# Patient Record
Sex: MALE | Race: WHITE | Employment: UNEMPLOYED | URBAN - METROPOLITAN AREA
[De-identification: names, ages, dates, MRNs, and addresses within clinical notes are randomized per-mention and may not be internally consistent; named-entity substitution may affect disease eponyms.]

---

## 2017-01-22 ENCOUNTER — HOSPITAL ENCOUNTER (EMERGENCY)
Facility: HOSPITAL | Age: 57
Discharge: HOME | End: 2017-01-22
Attending: EMERGENCY MEDICINE
Payer: COMMERCIAL

## 2017-01-22 ENCOUNTER — APPOINTMENT (OUTPATIENT)
Dept: CT IMAGING | Facility: HOSPITAL | Age: 57
End: 2017-01-22
Attending: EMERGENCY MEDICINE
Payer: COMMERCIAL

## 2017-01-22 VITALS
RESPIRATION RATE: 16 BRPM | WEIGHT: 275 LBS | TEMPERATURE: 98 F | HEIGHT: 76 IN | SYSTOLIC BLOOD PRESSURE: 153 MMHG | HEART RATE: 78 BPM | OXYGEN SATURATION: 95 % | DIASTOLIC BLOOD PRESSURE: 86 MMHG | BODY MASS INDEX: 33.49 KG/M2

## 2017-01-22 DIAGNOSIS — G40.909 SEIZURE DISORDER (HCC): Primary | ICD-10-CM

## 2017-01-22 LAB
ALBUMIN SERPL-MCNC: 3.9 G/DL (ref 3.5–4.8)
ALP LIVER SERPL-CCNC: 109 U/L (ref 45–117)
ALT SERPL-CCNC: 21 U/L (ref 17–63)
AST SERPL-CCNC: 14 U/L (ref 15–41)
ATRIAL RATE: 79 BPM
BASOPHILS # BLD AUTO: 0.03 X10(3) UL (ref 0–0.1)
BASOPHILS NFR BLD AUTO: 0.6 %
BILIRUB SERPL-MCNC: 0.4 MG/DL (ref 0.1–2)
BILIRUB UR QL STRIP.AUTO: NEGATIVE
BUN BLD-MCNC: 19 MG/DL (ref 8–20)
CALCIUM BLD-MCNC: 8.8 MG/DL (ref 8.3–10.3)
CHLORIDE: 105 MMOL/L (ref 101–111)
CLARITY UR REFRACT.AUTO: CLEAR
CO2: 26 MMOL/L (ref 22–32)
COLOR UR AUTO: YELLOW
CREAT BLD-MCNC: 0.92 MG/DL (ref 0.7–1.3)
EOSINOPHIL # BLD AUTO: 0.18 X10(3) UL (ref 0–0.3)
EOSINOPHIL NFR BLD AUTO: 3.8 %
ERYTHROCYTE [DISTWIDTH] IN BLOOD BY AUTOMATED COUNT: 12.9 % (ref 11.5–16)
GLUCOSE BLD-MCNC: 103 MG/DL (ref 70–99)
GLUCOSE BLD-MCNC: 93 MG/DL (ref 65–99)
GLUCOSE UR STRIP.AUTO-MCNC: NEGATIVE MG/DL
HCT VFR BLD AUTO: 42.6 % (ref 37–53)
HGB BLD-MCNC: 14.7 G/DL (ref 13–17)
IMMATURE GRANULOCYTE COUNT: 0.02 X10(3) UL (ref 0–1)
IMMATURE GRANULOCYTE RATIO %: 0.4 %
KETONES UR STRIP.AUTO-MCNC: NEGATIVE MG/DL
LEUKOCYTE ESTERASE UR QL STRIP.AUTO: NEGATIVE
LYMPHOCYTES # BLD AUTO: 1.25 X10(3) UL (ref 0.9–4)
LYMPHOCYTES NFR BLD AUTO: 26 %
M PROTEIN MFR SERPL ELPH: 7.7 G/DL (ref 6.1–8.3)
MCH RBC QN AUTO: 29.8 PG (ref 27–33.2)
MCHC RBC AUTO-ENTMCNC: 34.5 G/DL (ref 31–37)
MCV RBC AUTO: 86.2 FL (ref 80–99)
MONOCYTES # BLD AUTO: 0.45 X10(3) UL (ref 0.1–0.6)
MONOCYTES NFR BLD AUTO: 9.4 %
NEUTROPHIL ABS PRELIM: 2.87 X10 (3) UL (ref 1.3–6.7)
NEUTROPHILS # BLD AUTO: 2.87 X10(3) UL (ref 1.3–6.7)
NEUTROPHILS NFR BLD AUTO: 59.8 %
NITRITE UR QL STRIP.AUTO: NEGATIVE
P AXIS: 21 DEGREES
P-R INTERVAL: 136 MS
PH UR STRIP.AUTO: 7 [PH] (ref 4.5–8)
PLATELET # BLD AUTO: 230 10(3)UL (ref 150–450)
POTASSIUM SERPL-SCNC: 4.1 MMOL/L (ref 3.6–5.1)
PROT UR STRIP.AUTO-MCNC: NEGATIVE MG/DL
Q-T INTERVAL: 400 MS
QRS DURATION: 98 MS
QTC CALCULATION (BEZET): 458 MS
R AXIS: -8 DEGREES
RBC # BLD AUTO: 4.94 X10(6)UL (ref 4.3–5.7)
RBC UR QL AUTO: NEGATIVE
RED CELL DISTRIBUTION WIDTH-SD: 39.8 FL (ref 35.1–46.3)
SODIUM SERPL-SCNC: 138 MMOL/L (ref 136–144)
SP GR UR STRIP.AUTO: 1.01 (ref 1–1.03)
T AXIS: 62 DEGREES
UROBILINOGEN UR STRIP.AUTO-MCNC: <2 MG/DL
VENTRICULAR RATE: 79 BPM
WBC # BLD AUTO: 4.8 X10(3) UL (ref 4–13)

## 2017-01-22 PROCEDURE — 96365 THER/PROPH/DIAG IV INF INIT: CPT

## 2017-01-22 PROCEDURE — 80053 COMPREHEN METABOLIC PANEL: CPT | Performed by: EMERGENCY MEDICINE

## 2017-01-22 PROCEDURE — 96375 TX/PRO/DX INJ NEW DRUG ADDON: CPT

## 2017-01-22 PROCEDURE — 93005 ELECTROCARDIOGRAM TRACING: CPT

## 2017-01-22 PROCEDURE — 99285 EMERGENCY DEPT VISIT HI MDM: CPT

## 2017-01-22 PROCEDURE — 85025 COMPLETE CBC W/AUTO DIFF WBC: CPT | Performed by: EMERGENCY MEDICINE

## 2017-01-22 PROCEDURE — 81003 URINALYSIS AUTO W/O SCOPE: CPT | Performed by: EMERGENCY MEDICINE

## 2017-01-22 PROCEDURE — 82962 GLUCOSE BLOOD TEST: CPT

## 2017-01-22 PROCEDURE — 93010 ELECTROCARDIOGRAM REPORT: CPT

## 2017-01-22 PROCEDURE — 70450 CT HEAD/BRAIN W/O DYE: CPT

## 2017-01-22 RX ORDER — LEVETIRACETAM 500 MG/1
500 TABLET ORAL 2 TIMES DAILY
Qty: 60 TABLET | Refills: 0 | Status: SHIPPED | OUTPATIENT
Start: 2017-01-22 | End: 2017-02-08

## 2017-01-22 RX ORDER — LEVETIRACETAM 500 MG/1
500 TABLET ORAL ONCE
Status: DISCONTINUED | OUTPATIENT
Start: 2017-01-22 | End: 2017-01-22

## 2017-01-22 RX ORDER — LORAZEPAM 2 MG/ML
1 INJECTION INTRAMUSCULAR ONCE
Status: COMPLETED | OUTPATIENT
Start: 2017-01-22 | End: 2017-01-22

## 2017-01-22 NOTE — ED NOTES
Patient had another episode witnessed by family. A scream was heard from the room and wife came out to request assistance. Upon entering the room, patient is quiet and dazed. Patient identified that he was in the hospital but was unsure why.  MD was at beds
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
## 2017-01-24 PROCEDURE — 82746 ASSAY OF FOLIC ACID SERUM: CPT | Performed by: OTHER

## 2017-01-24 PROCEDURE — 82607 VITAMIN B-12: CPT | Performed by: OTHER

## 2017-01-24 PROCEDURE — 86038 ANTINUCLEAR ANTIBODIES: CPT | Performed by: OTHER

## 2017-01-24 PROCEDURE — 80177 DRUG SCRN QUAN LEVETIRACETAM: CPT | Performed by: OTHER

## 2017-03-02 PROCEDURE — 82746 ASSAY OF FOLIC ACID SERUM: CPT | Performed by: FAMILY MEDICINE

## 2017-03-02 PROCEDURE — 82607 VITAMIN B-12: CPT | Performed by: FAMILY MEDICINE

## 2017-03-24 PROCEDURE — 82941 ASSAY OF GASTRIN: CPT | Performed by: INTERNAL MEDICINE

## 2017-03-24 PROCEDURE — 83516 IMMUNOASSAY NONANTIBODY: CPT | Performed by: INTERNAL MEDICINE

## 2017-03-24 PROCEDURE — 82746 ASSAY OF FOLIC ACID SERUM: CPT | Performed by: FAMILY MEDICINE

## 2017-03-24 PROCEDURE — 82607 VITAMIN B-12: CPT | Performed by: FAMILY MEDICINE

## 2017-03-24 PROCEDURE — 86340 INTRINSIC FACTOR ANTIBODY: CPT | Performed by: INTERNAL MEDICINE

## 2017-05-11 PROCEDURE — 80177 DRUG SCRN QUAN LEVETIRACETAM: CPT | Performed by: OTHER

## 2017-05-15 PROBLEM — D51.0 PERNICIOUS ANEMIA: Status: ACTIVE | Noted: 2017-05-15

## 2017-07-07 PROCEDURE — 83921 ORGANIC ACID SINGLE QUANT: CPT | Performed by: INTERNAL MEDICINE

## 2017-07-07 PROCEDURE — 82746 ASSAY OF FOLIC ACID SERUM: CPT | Performed by: INTERNAL MEDICINE

## 2017-07-07 PROCEDURE — 82607 VITAMIN B-12: CPT | Performed by: INTERNAL MEDICINE

## 2017-09-15 PROCEDURE — 81003 URINALYSIS AUTO W/O SCOPE: CPT | Performed by: FAMILY MEDICINE

## 2017-10-17 PROCEDURE — 83921 ORGANIC ACID SINGLE QUANT: CPT | Performed by: INTERNAL MEDICINE

## 2017-10-17 PROCEDURE — 82746 ASSAY OF FOLIC ACID SERUM: CPT | Performed by: INTERNAL MEDICINE

## 2017-10-17 PROCEDURE — 82607 VITAMIN B-12: CPT | Performed by: INTERNAL MEDICINE

## 2018-01-10 PROCEDURE — 80177 DRUG SCRN QUAN LEVETIRACETAM: CPT | Performed by: OTHER

## 2018-01-10 PROCEDURE — 36415 COLL VENOUS BLD VENIPUNCTURE: CPT | Performed by: OTHER

## 2018-04-17 PROCEDURE — 82607 VITAMIN B-12: CPT | Performed by: INTERNAL MEDICINE

## 2018-04-17 PROCEDURE — 82746 ASSAY OF FOLIC ACID SERUM: CPT | Performed by: INTERNAL MEDICINE

## 2018-04-17 PROCEDURE — 83921 ORGANIC ACID SINGLE QUANT: CPT | Performed by: INTERNAL MEDICINE

## 2018-05-02 PROBLEM — M75.100 ROTATOR CUFF SYNDROME, UNSPECIFIED LATERALITY: Status: ACTIVE | Noted: 2018-05-02

## 2018-05-03 ENCOUNTER — HOSPITAL ENCOUNTER (INPATIENT)
Facility: HOSPITAL | Age: 58
LOS: 2 days | Discharge: HOME | DRG: 195 | End: 2018-05-05
Attending: EMERGENCY MEDICINE | Admitting: INTERNAL MEDICINE
Payer: COMMERCIAL

## 2018-05-03 ENCOUNTER — APPOINTMENT (OUTPATIENT)
Dept: GENERAL RADIOLOGY | Facility: HOSPITAL | Age: 58
DRG: 195 | End: 2018-05-03
Payer: COMMERCIAL

## 2018-05-03 DIAGNOSIS — R09.02 HYPOXIA: ICD-10-CM

## 2018-05-03 DIAGNOSIS — J18.9 COMMUNITY ACQUIRED PNEUMONIA, UNSPECIFIED LATERALITY: ICD-10-CM

## 2018-05-03 DIAGNOSIS — R50.9 FEVER, UNSPECIFIED FEVER CAUSE: Primary | ICD-10-CM

## 2018-05-03 PROCEDURE — 96365 THER/PROPH/DIAG IV INF INIT: CPT

## 2018-05-03 PROCEDURE — 71046 X-RAY EXAM CHEST 2 VIEWS: CPT

## 2018-05-03 PROCEDURE — 87040 BLOOD CULTURE FOR BACTERIA: CPT | Performed by: EMERGENCY MEDICINE

## 2018-05-03 PROCEDURE — 85025 COMPLETE CBC W/AUTO DIFF WBC: CPT | Performed by: EMERGENCY MEDICINE

## 2018-05-03 PROCEDURE — 80053 COMPREHEN METABOLIC PANEL: CPT | Performed by: EMERGENCY MEDICINE

## 2018-05-03 PROCEDURE — 94640 AIRWAY INHALATION TREATMENT: CPT

## 2018-05-03 PROCEDURE — 96361 HYDRATE IV INFUSION ADD-ON: CPT

## 2018-05-03 PROCEDURE — 36415 COLL VENOUS BLD VENIPUNCTURE: CPT

## 2018-05-03 PROCEDURE — 83605 ASSAY OF LACTIC ACID: CPT | Performed by: EMERGENCY MEDICINE

## 2018-05-03 PROCEDURE — 99285 EMERGENCY DEPT VISIT HI MDM: CPT

## 2018-05-03 PROCEDURE — 96375 TX/PRO/DX INJ NEW DRUG ADDON: CPT

## 2018-05-03 RX ORDER — ACETAMINOPHEN 500 MG
1000 TABLET ORAL ONCE
Status: COMPLETED | OUTPATIENT
Start: 2018-05-03 | End: 2018-05-03

## 2018-05-03 RX ORDER — ONDANSETRON 2 MG/ML
4 INJECTION INTRAMUSCULAR; INTRAVENOUS EVERY 4 HOURS PRN
Status: DISCONTINUED | OUTPATIENT
Start: 2018-05-03 | End: 2018-05-04

## 2018-05-03 RX ORDER — IPRATROPIUM BROMIDE AND ALBUTEROL SULFATE 2.5; .5 MG/3ML; MG/3ML
3 SOLUTION RESPIRATORY (INHALATION) ONCE
Status: COMPLETED | OUTPATIENT
Start: 2018-05-03 | End: 2018-05-03

## 2018-05-03 RX ORDER — KETOROLAC TROMETHAMINE 30 MG/ML
30 INJECTION, SOLUTION INTRAMUSCULAR; INTRAVENOUS ONCE
Status: COMPLETED | OUTPATIENT
Start: 2018-05-03 | End: 2018-05-03

## 2018-05-03 RX ORDER — ACETAMINOPHEN 500 MG
TABLET ORAL
Status: COMPLETED
Start: 2018-05-03 | End: 2018-05-03

## 2018-05-03 RX ORDER — SODIUM CHLORIDE 9 MG/ML
INJECTION, SOLUTION INTRAVENOUS CONTINUOUS
Status: ACTIVE | OUTPATIENT
Start: 2018-05-03 | End: 2018-05-04

## 2018-05-04 PROCEDURE — 82962 GLUCOSE BLOOD TEST: CPT

## 2018-05-04 PROCEDURE — 87798 DETECT AGENT NOS DNA AMP: CPT | Performed by: HOSPITALIST

## 2018-05-04 PROCEDURE — 85025 COMPLETE CBC W/AUTO DIFF WBC: CPT | Performed by: INTERNAL MEDICINE

## 2018-05-04 PROCEDURE — 87449 NOS EACH ORGANISM AG IA: CPT | Performed by: HOSPITALIST

## 2018-05-04 PROCEDURE — 87999 UNLISTED MICROBIOLOGY PX: CPT

## 2018-05-04 PROCEDURE — 87899 AGENT NOS ASSAY W/OPTIC: CPT | Performed by: HOSPITALIST

## 2018-05-04 PROCEDURE — 87633 RESP VIRUS 12-25 TARGETS: CPT | Performed by: HOSPITALIST

## 2018-05-04 PROCEDURE — 87581 M.PNEUMON DNA AMP PROBE: CPT | Performed by: HOSPITALIST

## 2018-05-04 PROCEDURE — 80048 BASIC METABOLIC PNL TOTAL CA: CPT | Performed by: INTERNAL MEDICINE

## 2018-05-04 PROCEDURE — 83036 HEMOGLOBIN GLYCOSYLATED A1C: CPT | Performed by: INTERNAL MEDICINE

## 2018-05-04 PROCEDURE — 87486 CHLMYD PNEUM DNA AMP PROBE: CPT | Performed by: HOSPITALIST

## 2018-05-04 RX ORDER — LEVOTHYROXINE SODIUM 0.2 MG/1
200 TABLET ORAL
Status: DISCONTINUED | OUTPATIENT
Start: 2018-05-04 | End: 2018-05-04

## 2018-05-04 RX ORDER — ONDANSETRON 2 MG/ML
4 INJECTION INTRAMUSCULAR; INTRAVENOUS EVERY 6 HOURS PRN
Status: DISCONTINUED | OUTPATIENT
Start: 2018-05-04 | End: 2018-05-05

## 2018-05-04 RX ORDER — IPRATROPIUM BROMIDE AND ALBUTEROL SULFATE 2.5; .5 MG/3ML; MG/3ML
3 SOLUTION RESPIRATORY (INHALATION) EVERY 4 HOURS PRN
Status: DISCONTINUED | OUTPATIENT
Start: 2018-05-04 | End: 2018-05-05

## 2018-05-04 RX ORDER — LEVETIRACETAM 500 MG/1
1000 TABLET ORAL 2 TIMES DAILY
Status: DISCONTINUED | OUTPATIENT
Start: 2018-05-04 | End: 2018-05-05

## 2018-05-04 RX ORDER — SODIUM CHLORIDE 9 MG/ML
INJECTION, SOLUTION INTRAVENOUS CONTINUOUS
Status: DISCONTINUED | OUTPATIENT
Start: 2018-05-04 | End: 2018-05-05

## 2018-05-04 RX ORDER — ENOXAPARIN SODIUM 100 MG/ML
40 INJECTION SUBCUTANEOUS DAILY
Status: DISCONTINUED | OUTPATIENT
Start: 2018-05-04 | End: 2018-05-05

## 2018-05-04 RX ORDER — LEVETIRACETAM 1000 MG/1
1000 TABLET ORAL 2 TIMES DAILY
COMMUNITY
End: 2019-01-03

## 2018-05-04 RX ORDER — DEXTROSE MONOHYDRATE 25 G/50ML
50 INJECTION, SOLUTION INTRAVENOUS
Status: DISCONTINUED | OUTPATIENT
Start: 2018-05-04 | End: 2018-05-05

## 2018-05-04 RX ORDER — BISACODYL 10 MG
10 SUPPOSITORY, RECTAL RECTAL
Status: DISCONTINUED | OUTPATIENT
Start: 2018-05-04 | End: 2018-05-05

## 2018-05-04 RX ORDER — ACETAMINOPHEN 325 MG/1
650 TABLET ORAL EVERY 6 HOURS PRN
Status: DISCONTINUED | OUTPATIENT
Start: 2018-05-04 | End: 2018-05-05

## 2018-05-04 RX ORDER — METOCLOPRAMIDE HYDROCHLORIDE 5 MG/ML
10 INJECTION INTRAMUSCULAR; INTRAVENOUS EVERY 8 HOURS PRN
Status: DISCONTINUED | OUTPATIENT
Start: 2018-05-04 | End: 2018-05-05

## 2018-05-04 RX ORDER — POLYETHYLENE GLYCOL 3350 17 G/17G
17 POWDER, FOR SOLUTION ORAL DAILY PRN
Status: DISCONTINUED | OUTPATIENT
Start: 2018-05-04 | End: 2018-05-05

## 2018-05-04 RX ORDER — DOCUSATE SODIUM 100 MG/1
100 CAPSULE, LIQUID FILLED ORAL 2 TIMES DAILY
Status: DISCONTINUED | OUTPATIENT
Start: 2018-05-04 | End: 2018-05-05

## 2018-05-04 RX ORDER — LEVOTHYROXINE SODIUM 0.2 MG/1
200 TABLET ORAL
Status: DISCONTINUED | OUTPATIENT
Start: 2018-05-04 | End: 2018-05-05

## 2018-05-04 RX ORDER — BENZONATATE 200 MG/1
200 CAPSULE ORAL 3 TIMES DAILY
Status: DISCONTINUED | OUTPATIENT
Start: 2018-05-04 | End: 2018-05-05

## 2018-05-04 RX ORDER — SODIUM PHOSPHATE, DIBASIC AND SODIUM PHOSPHATE, MONOBASIC 7; 19 G/133ML; G/133ML
1 ENEMA RECTAL ONCE AS NEEDED
Status: DISCONTINUED | OUTPATIENT
Start: 2018-05-04 | End: 2018-05-05

## 2018-05-04 NOTE — HOME CARE LIAISON
Received referral from [address] [place] [address]. Met with patient at bedside. Discussed [facility] services upon discharge. Patient declined servcies. Brochure left with patient.

## 2018-05-04 NOTE — PAYOR COMM NOTE
--------------  ADMISSION REVIEW       5/3    ED LATE       Patient presents with:  Dyspnea YARY SOB   Fever   Cough/URI     Stated Complaint: yary/cough/fever + DYSPNEA        This is a 59-year-old male who is been having a cold, runny nose for for the last 102.9  Community acquired pneumonia,  Hypoxia SATS 91     ZITHROMAX IV QD  ROCEPHIN IV QD  DUONEBS  IVF BOLUS

## 2018-05-04 NOTE — CM/SW NOTE
05/04/18 1600   CM/SW Screening   Referral Source    Information Source Chart review;Nursing rounds   Patient's Mental Status Alert;Oriented   Patient's [address]   Patient lives with Spouse   Patient Status Prior to Admission

## 2018-05-04 NOTE — PROGRESS NOTES
[facility] Pharmacy Note: Antimicrobial Weight Dose Adjustment for: ceftriaxone (ROCEPHIN)    Liza Friday is a 56year old male who has been prescribed ceftriaxone (ROCEPHIN) 1 gram IVPB for one dose. Pt has a weight/BMI of 137 kg/36.  Based on this criteria and

## 2018-05-04 NOTE — PROGRESS NOTES
Multidisciplinary Discharge Rounds held 5/4/2018. Treatment team members present today include , , Charge Nurse,  Nurse, RT, PT and Pharmacy caring for [facility].      Other care providers present:    Patient Active Problem Alray Stable

## 2018-05-04 NOTE — ED PROVIDER NOTES
Patient Seen in: [facility] Emergency Department    History   Patient presents with:  Dyspnea YARY SOB (respiratory)  Fever (infectious)  Cough/URI    Stated Complaint: yary/cough/fever    HPI    This is a 61-year-old male who is been having a cold, r noted above.     Physical Exam   ED Triage Vitals [05/03/18 7590]  BP: 124/80  Pulse: 114  Resp: (!) 32  Temp: 99.9 °F (37.7 °C)  Temp src: Temporal  SpO2: 94 %  O2 Device: None (Room air)    Current:/74   Pulse 92   Temp 100.2 °F (37.9 °C) (Oral)   R Course as of May 03 2222  ------------------------------------------------------------      [facility]     Admission disposition: 5/3/2018  9:28 PM           The patient was placed on monitors, IV was started, blood was drawn. .  Xr Chest Pa + Lat Chest (cpt=71046 Clinical Impression:  Fever, unspecified fever cause  (primary encounter diagnosis)  Community acquired pneumonia, unspecified laterality  Hypoxia    Disposition:  Admit  5/3/2018  9:28 pm    Follow-up:  No follow-up provider specified.       Medication

## 2018-05-04 NOTE — H&P
[facility] Hospitalist History and Physical      Patient presents with:  Dyspnea ANJUM SOB (respiratory)  Fever (infectious)  Cough/URI       PCP: Layla Davis DO      History of Present Illness: Patient is a 56year old male with PMH sig for hypothyroidism, seiz Mother    • Dementia Mother    • Other [OTHER] Brother      thyroid cancer   • Cancer Paternal Grandfather      Unknown type       Review of Systems  Comprehensive ROS reviewed and negative except for what is stated in HPI.       OBJECTIVE:  /78   Pul seen within the left lung base. Right lung is clear. Bronchial wall thickening is CARDIAC:  Normal size cardiac silhouette. MEDIASTINUM:  Normal. PLEURA:  Normal.  No pleural effusions. BONES:  Normal for age. CONCLUSION:  1.  Airspace opacity is wit intact. There is no fracture or dislocation. Soft tissues are normal. Lung field and view is grossly clear. IMPRESSION:  Unremarkable bilateral shoulder films with minimal degenerative changes of the AC joints bilaterally.        Assessment/Plan:     56

## 2018-05-04 NOTE — PLAN OF CARE
CARDIOVASCULAR - ADULT    • Absence of cardiac arrhythmias or at baseline Progressing        Diabetes/Glucose Control    • Glucose maintained within prescribed range Progressing        METABOLIC/FLUID AND ELECTROLYTES - ADULT    • Glucose maintained within

## 2018-05-04 NOTE — PLAN OF CARE
Problem: Patient/Family Goals  Goal: Patient/Family Short Term Goal  Patient's Short Term Goal:   5/4 Admit: to get medications and sleep  5/4-resolve pna    Interventions:   5/4-iv abx  - Admit orders  - Cluster care  - See additional Care Plan goals for

## 2018-05-04 NOTE — PLAN OF CARE
NURSING ADMISSION NOTE      Patient admitted via Cart  Oriented to room 514. Safety precautions initiated. Bed in low position. Call light in reach.

## 2018-05-04 NOTE — ED NOTES
Pt received at this time awake and alert but very weak and moaning. Pt hot and flushed with semi-moist skin. Pt respirations are fast and have a labored expiratory phase. Lungs clear in all fields and frequent bronchial cough noted.   Pt has a runny nose

## 2018-05-05 VITALS
WEIGHT: 305 LBS | RESPIRATION RATE: 18 BRPM | DIASTOLIC BLOOD PRESSURE: 76 MMHG | BODY MASS INDEX: 37.14 KG/M2 | SYSTOLIC BLOOD PRESSURE: 128 MMHG | OXYGEN SATURATION: 96 % | HEART RATE: 61 BPM | TEMPERATURE: 98 F | HEIGHT: 76 IN

## 2018-05-05 PROCEDURE — 82962 GLUCOSE BLOOD TEST: CPT

## 2018-05-05 PROCEDURE — 84132 ASSAY OF SERUM POTASSIUM: CPT | Performed by: INTERNAL MEDICINE

## 2018-05-05 RX ORDER — AZITHROMYCIN 500 MG/1
500 TABLET, FILM COATED ORAL DAILY
Qty: 1 TABLET | Refills: 0 | Status: SHIPPED | OUTPATIENT
Start: 2018-05-06 | End: 2018-05-07

## 2018-05-05 RX ORDER — POTASSIUM CHLORIDE 20 MEQ/1
40 TABLET, EXTENDED RELEASE ORAL ONCE
Status: COMPLETED | OUTPATIENT
Start: 2018-05-05 | End: 2018-05-05

## 2018-05-05 RX ORDER — CEFDINIR 300 MG/1
300 CAPSULE ORAL 2 TIMES DAILY
Qty: 16 CAPSULE | Refills: 0 | Status: SHIPPED | OUTPATIENT
Start: 2018-05-05 | End: 2018-05-13

## 2018-05-05 RX ORDER — BENZONATATE 200 MG/1
200 CAPSULE ORAL 3 TIMES DAILY PRN
Qty: 30 CAPSULE | Refills: 0 | Status: SHIPPED | OUTPATIENT
Start: 2018-05-05 | End: 2018-07-02 | Stop reason: ALTCHOICE

## 2018-05-05 NOTE — DISCHARGE SUMMARY
[address], [place]  Authorization for Invasive Procedures  1.  I hereby authorize Dr. Rain Cano , my physician and whomever may be designated as the doctor's assistant, to perform the following operation and/or procedure:  Cholecystom General Medicine Discharge Summary     Patient ID:  Heide Birch  56year old  10/23/1960    Admit date: 5/3/2018    Discharge date and time: 5/5/18    Attending Physician: Xuan Lorenzana MD     Primary C further management    Hospital course:    Hypoxia- resolved on discharge  -2/2 PNA  -on room air on day of discharge     PNA  -CXR personally reviewed, report with left airspace opacity  -IV ceftriaxone and azithromycin transitioned to PO omnicef and azith performed for the purposes of advancing medicine, science, and/or education, provided my identity is not revealed. If the procedure has been videotaped, the physician/surgeon will obtain the original videotape.  The hospital will not be responsible for stor My signature below affirms that prior to the time of the procedure, I have explained to the patient and/or his legal representative, the risks and benefits involved in the proposed treatment and any reasonable alternative to the proposed treatment.  I have care for discharge: 40 minutes    Yao Urias MD  [facility] Hospitalist  [phone]

## 2018-05-05 NOTE — PROGRESS NOTES
NURSING DISCHARGE NOTE    Discharged Home via Wheelchair. Accompanied by Family member and Support staff  Belongings Taken by patient/family. Discharge education completed.  New prescription and disease information handouts provided to and reviewed

## 2018-05-07 ENCOUNTER — APPOINTMENT (OUTPATIENT)
Dept: GENERAL RADIOLOGY | Facility: HOSPITAL | Age: 58
End: 2018-05-07
Attending: EMERGENCY MEDICINE
Payer: COMMERCIAL

## 2018-05-07 ENCOUNTER — HOSPITAL ENCOUNTER (EMERGENCY)
Facility: HOSPITAL | Age: 58
Discharge: HOME | End: 2018-05-07
Attending: EMERGENCY MEDICINE
Payer: COMMERCIAL

## 2018-05-07 VITALS
WEIGHT: 295 LBS | DIASTOLIC BLOOD PRESSURE: 74 MMHG | SYSTOLIC BLOOD PRESSURE: 115 MMHG | OXYGEN SATURATION: 93 % | TEMPERATURE: 98 F | HEART RATE: 68 BPM | RESPIRATION RATE: 17 BRPM | BODY MASS INDEX: 35.92 KG/M2 | HEIGHT: 76 IN

## 2018-05-07 DIAGNOSIS — J18.9 COMMUNITY ACQUIRED PNEUMONIA, UNSPECIFIED LATERALITY: Primary | ICD-10-CM

## 2018-05-07 DIAGNOSIS — R05.9 COUGH: ICD-10-CM

## 2018-05-07 PROCEDURE — 96361 HYDRATE IV INFUSION ADD-ON: CPT

## 2018-05-07 PROCEDURE — 94640 AIRWAY INHALATION TREATMENT: CPT

## 2018-05-07 PROCEDURE — 80053 COMPREHEN METABOLIC PANEL: CPT | Performed by: EMERGENCY MEDICINE

## 2018-05-07 PROCEDURE — 85025 COMPLETE CBC W/AUTO DIFF WBC: CPT | Performed by: EMERGENCY MEDICINE

## 2018-05-07 PROCEDURE — 99285 EMERGENCY DEPT VISIT HI MDM: CPT

## 2018-05-07 PROCEDURE — 36415 COLL VENOUS BLD VENIPUNCTURE: CPT

## 2018-05-07 PROCEDURE — 93010 ELECTROCARDIOGRAM REPORT: CPT

## 2018-05-07 PROCEDURE — 71045 X-RAY EXAM CHEST 1 VIEW: CPT | Performed by: EMERGENCY MEDICINE

## 2018-05-07 PROCEDURE — 96374 THER/PROPH/DIAG INJ IV PUSH: CPT

## 2018-05-07 PROCEDURE — 87040 BLOOD CULTURE FOR BACTERIA: CPT | Performed by: EMERGENCY MEDICINE

## 2018-05-07 PROCEDURE — 83605 ASSAY OF LACTIC ACID: CPT | Performed by: EMERGENCY MEDICINE

## 2018-05-07 PROCEDURE — 93005 ELECTROCARDIOGRAM TRACING: CPT

## 2018-05-07 RX ORDER — IPRATROPIUM BROMIDE AND ALBUTEROL SULFATE 2.5; .5 MG/3ML; MG/3ML
3 SOLUTION RESPIRATORY (INHALATION) ONCE
Status: COMPLETED | OUTPATIENT
Start: 2018-05-07 | End: 2018-05-07

## 2018-05-07 RX ORDER — METHYLPREDNISOLONE SODIUM SUCCINATE 125 MG/2ML
125 INJECTION, POWDER, LYOPHILIZED, FOR SOLUTION INTRAMUSCULAR; INTRAVENOUS ONCE
Status: COMPLETED | OUTPATIENT
Start: 2018-05-07 | End: 2018-05-07

## 2018-05-07 RX ORDER — ALBUTEROL SULFATE 2.5 MG/3ML
2.5 SOLUTION RESPIRATORY (INHALATION) EVERY 4 HOURS PRN
Qty: 30 AMPULE | Refills: 0 | Status: SHIPPED | OUTPATIENT
Start: 2018-05-07 | End: 2018-06-06

## 2018-05-07 RX ORDER — PREDNISONE 20 MG/1
60 TABLET ORAL DAILY
Qty: 12 TABLET | Refills: 0 | Status: SHIPPED | OUTPATIENT
Start: 2018-05-08 | End: 2018-05-12

## 2018-05-07 NOTE — ED INITIAL ASSESSMENT (HPI)
Pt aox4. Pt presents to ed from home accompanied by wife. Pt ws recently admitted to hospital for pneumonia.  Pt returns to ed with fever, yary, cough

## 2018-05-07 NOTE — ED NOTES
Discussed with Dr. Sukh Sylvester about 30/kg fluid bolus. Will administer off of ideal body weight, which is approximately 205 lbs/93 kg. Approx 2800 ml is the fluid bolus at 30/kg.

## 2018-05-07 NOTE — ED PROVIDER NOTES
Patient Seen in: [facility] Emergency Department    History   Patient presents with:  Dyspnea YARY SOB (respiratory)    Stated Complaint: cough/yary    HPI    82-year-old male presents to the emergency department complaining of cough and difficulty br Device: None (Room air) [05/07/18 0824]    Current:/74   Pulse 68   Temp 97.8 °F (36.6 °C) (Oral)   Resp 17   Ht 193 cm (6' 4\")   Wt 133.8 kg   SpO2 93%   BMI 35.91 kg/m²         Physical Exam    General appearance:  This is a middle-aged male lying had initiated a 30 mL/kg bolus of normal saline. A nebulizer treatment with albuterol and Atrovent was administered.     While the patient stated that he was not symptomatically improved from the albuterol, he fell asleep on the gurney and was not noted to

## 2018-05-22 PROBLEM — M25.512 CHRONIC PAIN OF BOTH SHOULDERS: Status: ACTIVE | Noted: 2018-05-22

## 2018-05-22 PROBLEM — G89.29 CHRONIC PAIN OF BOTH SHOULDERS: Status: ACTIVE | Noted: 2018-05-22

## 2018-05-22 PROBLEM — M25.511 CHRONIC PAIN OF BOTH SHOULDERS: Status: ACTIVE | Noted: 2018-05-22

## 2018-07-10 PROCEDURE — 82043 UR ALBUMIN QUANTITATIVE: CPT | Performed by: FAMILY MEDICINE

## 2018-07-10 PROCEDURE — 80177 DRUG SCRN QUAN LEVETIRACETAM: CPT | Performed by: OTHER

## 2018-07-10 PROCEDURE — 82570 ASSAY OF URINE CREATININE: CPT | Performed by: FAMILY MEDICINE

## 2018-07-25 PROBLEM — M75.02 ADHESIVE CAPSULITIS OF LEFT SHOULDER: Status: ACTIVE | Noted: 2018-07-25

## 2018-10-23 PROBLEM — M25.512 CHRONIC PAIN IN LEFT SHOULDER: Status: ACTIVE | Noted: 2018-10-23

## 2018-10-23 PROBLEM — G89.29 CHRONIC PAIN IN LEFT SHOULDER: Status: ACTIVE | Noted: 2018-10-23

## 2018-11-16 PROCEDURE — 84402 ASSAY OF FREE TESTOSTERONE: CPT | Performed by: FAMILY MEDICINE

## 2018-11-16 PROCEDURE — 84403 ASSAY OF TOTAL TESTOSTERONE: CPT | Performed by: FAMILY MEDICINE

## 2018-11-16 PROCEDURE — 36415 COLL VENOUS BLD VENIPUNCTURE: CPT | Performed by: FAMILY MEDICINE

## 2019-01-03 PROBLEM — J18.9 COMMUNITY ACQUIRED PNEUMONIA, UNSPECIFIED LATERALITY: Status: RESOLVED | Noted: 2018-05-03 | Resolved: 2019-01-03

## 2019-01-03 PROBLEM — R50.9 FEVER, UNSPECIFIED FEVER CAUSE: Status: RESOLVED | Noted: 2018-05-03 | Resolved: 2019-01-03

## 2019-01-03 PROBLEM — M25.512 CHRONIC PAIN IN LEFT SHOULDER: Status: RESOLVED | Noted: 2018-10-23 | Resolved: 2019-01-03

## 2019-01-03 PROBLEM — G89.29 CHRONIC PAIN IN LEFT SHOULDER: Status: RESOLVED | Noted: 2018-10-23 | Resolved: 2019-01-03

## 2019-01-03 PROBLEM — M75.100 ROTATOR CUFF SYNDROME, UNSPECIFIED LATERALITY: Status: RESOLVED | Noted: 2018-05-02 | Resolved: 2019-01-03

## 2019-01-03 PROBLEM — G89.29 CHRONIC PAIN OF BOTH SHOULDERS: Status: RESOLVED | Noted: 2018-05-22 | Resolved: 2019-01-03

## 2019-01-03 PROBLEM — M25.511 CHRONIC PAIN OF BOTH SHOULDERS: Status: RESOLVED | Noted: 2018-05-22 | Resolved: 2019-01-03

## 2019-01-03 PROBLEM — M25.512 CHRONIC PAIN OF BOTH SHOULDERS: Status: RESOLVED | Noted: 2018-05-22 | Resolved: 2019-01-03

## 2019-01-03 PROBLEM — R50.9 FEVER: Status: RESOLVED | Noted: 2018-05-03 | Resolved: 2019-01-03

## 2019-01-03 PROBLEM — R09.02 HYPOXIA: Status: RESOLVED | Noted: 2018-05-03 | Resolved: 2019-01-03

## 2019-01-03 PROBLEM — M75.02 ADHESIVE CAPSULITIS OF LEFT SHOULDER: Status: RESOLVED | Noted: 2018-07-25 | Resolved: 2019-01-03

## 2019-02-04 PROCEDURE — 80177 DRUG SCRN QUAN LEVETIRACETAM: CPT | Performed by: OTHER

## 2019-02-28 PROCEDURE — 84403 ASSAY OF TOTAL TESTOSTERONE: CPT | Performed by: FAMILY MEDICINE

## 2019-02-28 PROCEDURE — 84402 ASSAY OF FREE TESTOSTERONE: CPT | Performed by: FAMILY MEDICINE

## 2019-07-18 PROCEDURE — 80177 DRUG SCRN QUAN LEVETIRACETAM: CPT | Performed by: OTHER

## 2019-07-18 PROCEDURE — 36415 COLL VENOUS BLD VENIPUNCTURE: CPT | Performed by: OTHER

## 2020-03-04 ENCOUNTER — HOSPITAL ENCOUNTER (EMERGENCY)
Facility: HOSPITAL | Age: 60
Discharge: HOME | End: 2020-03-04
Attending: EMERGENCY MEDICINE
Payer: COMMERCIAL

## 2020-03-04 ENCOUNTER — APPOINTMENT (OUTPATIENT)
Dept: GENERAL RADIOLOGY | Facility: HOSPITAL | Age: 60
End: 2020-03-04
Payer: COMMERCIAL

## 2020-03-04 VITALS
BODY MASS INDEX: 36.53 KG/M2 | SYSTOLIC BLOOD PRESSURE: 119 MMHG | OXYGEN SATURATION: 95 % | RESPIRATION RATE: 20 BRPM | HEART RATE: 66 BPM | DIASTOLIC BLOOD PRESSURE: 88 MMHG | WEIGHT: 300 LBS | TEMPERATURE: 98 F | HEIGHT: 76 IN

## 2020-03-04 DIAGNOSIS — S29.011A INTERCOSTAL MUSCLE STRAIN, INITIAL ENCOUNTER: Primary | ICD-10-CM

## 2020-03-04 LAB
ADENOVIRUS PCR:: NEGATIVE
ALBUMIN SERPL-MCNC: 3.5 G/DL (ref 3.4–5)
ALBUMIN/GLOB SERPL: 0.7 {RATIO} (ref 1–2)
ALP LIVER SERPL-CCNC: 104 U/L (ref 45–117)
ALT SERPL-CCNC: 24 U/L (ref 16–61)
ANION GAP SERPL CALC-SCNC: 6 MMOL/L (ref 0–18)
AST SERPL-CCNC: 40 U/L (ref 15–37)
B PERT DNA SPEC QL NAA+PROBE: NEGATIVE
BASOPHILS # BLD AUTO: 0.03 X10(3) UL (ref 0–0.2)
BASOPHILS NFR BLD AUTO: 0.2 %
BILIRUB SERPL-MCNC: 0.4 MG/DL (ref 0.1–2)
BUN BLD-MCNC: 19 MG/DL (ref 7–18)
BUN/CREAT SERPL: 17.3 (ref 10–20)
C PNEUM DNA SPEC QL NAA+PROBE: NEGATIVE
CALCIUM BLD-MCNC: 8.6 MG/DL (ref 8.5–10.1)
CHLORIDE SERPL-SCNC: 108 MMOL/L (ref 98–112)
CO2 SERPL-SCNC: 20 MMOL/L (ref 21–32)
CORONAVIRUS 229E PCR:: NEGATIVE
CORONAVIRUS HKU1 PCR:: NEGATIVE
CORONAVIRUS NL63 PCR:: NEGATIVE
CORONAVIRUS OC43 PCR:: NEGATIVE
CREAT BLD-MCNC: 1.1 MG/DL (ref 0.7–1.3)
DEPRECATED RDW RBC AUTO: 43.1 FL (ref 35.1–46.3)
EOSINOPHIL # BLD AUTO: 0.05 X10(3) UL (ref 0–0.7)
EOSINOPHIL NFR BLD AUTO: 0.4 %
ERYTHROCYTE [DISTWIDTH] IN BLOOD BY AUTOMATED COUNT: 14.8 % (ref 11–15)
FLUAV RNA SPEC QL NAA+PROBE: NEGATIVE
FLUBV RNA SPEC QL NAA+PROBE: NEGATIVE
GLOBULIN PLAS-MCNC: 4.9 G/DL (ref 2.8–4.4)
GLUCOSE BLD-MCNC: 248 MG/DL (ref 70–99)
HCT VFR BLD AUTO: 49.4 % (ref 39–53)
HGB BLD-MCNC: 16.2 G/DL (ref 13–17.5)
IMM GRANULOCYTES # BLD AUTO: 0.06 X10(3) UL (ref 0–1)
IMM GRANULOCYTES NFR BLD: 0.4 %
LYMPHOCYTES # BLD AUTO: 1.68 X10(3) UL (ref 1–4)
LYMPHOCYTES NFR BLD AUTO: 11.8 %
M PROTEIN MFR SERPL ELPH: 8.4 G/DL (ref 6.4–8.2)
MCH RBC QN AUTO: 27.1 PG (ref 26–34)
MCHC RBC AUTO-ENTMCNC: 32.8 G/DL (ref 31–37)
MCV RBC AUTO: 82.7 FL (ref 80–100)
METAPNEUMOVIRUS PCR:: NEGATIVE
MONOCYTES # BLD AUTO: 1.03 X10(3) UL (ref 0.1–1)
MONOCYTES NFR BLD AUTO: 7.2 %
MYCOPLASMA PNEUMONIA PCR:: NEGATIVE
NEUTROPHILS # BLD AUTO: 11.4 X10 (3) UL (ref 1.5–7.7)
NEUTROPHILS # BLD AUTO: 11.4 X10(3) UL (ref 1.5–7.7)
NEUTROPHILS NFR BLD AUTO: 80 %
OSMOLALITY SERPL CALC.SUM OF ELEC: 289 MOSM/KG (ref 275–295)
PARAINFLUENZA 1 PCR:: NEGATIVE
PARAINFLUENZA 2 PCR:: NEGATIVE
PARAINFLUENZA 3 PCR:: NEGATIVE
PARAINFLUENZA 4 PCR:: NEGATIVE
PLATELET # BLD AUTO: 319 10(3)UL (ref 150–450)
POTASSIUM SERPL-SCNC: 4.6 MMOL/L (ref 3.5–5.1)
RBC # BLD AUTO: 5.97 X10(6)UL (ref 4.3–5.7)
RHINOVIRUS/ENTERO PCR:: NEGATIVE
RSV RNA SPEC QL NAA+PROBE: NEGATIVE
SODIUM SERPL-SCNC: 134 MMOL/L (ref 136–145)
WBC # BLD AUTO: 14.3 X10(3) UL (ref 4–11)

## 2020-03-04 PROCEDURE — 96361 HYDRATE IV INFUSION ADD-ON: CPT

## 2020-03-04 PROCEDURE — 99284 EMERGENCY DEPT VISIT MOD MDM: CPT

## 2020-03-04 PROCEDURE — 71101 X-RAY EXAM UNILAT RIBS/CHEST: CPT

## 2020-03-04 PROCEDURE — 87486 CHLMYD PNEUM DNA AMP PROBE: CPT | Performed by: EMERGENCY MEDICINE

## 2020-03-04 PROCEDURE — 85025 COMPLETE CBC W/AUTO DIFF WBC: CPT | Performed by: EMERGENCY MEDICINE

## 2020-03-04 PROCEDURE — 87581 M.PNEUMON DNA AMP PROBE: CPT | Performed by: EMERGENCY MEDICINE

## 2020-03-04 PROCEDURE — 87798 DETECT AGENT NOS DNA AMP: CPT | Performed by: EMERGENCY MEDICINE

## 2020-03-04 PROCEDURE — 96375 TX/PRO/DX INJ NEW DRUG ADDON: CPT

## 2020-03-04 PROCEDURE — 87633 RESP VIRUS 12-25 TARGETS: CPT | Performed by: EMERGENCY MEDICINE

## 2020-03-04 PROCEDURE — 87999 UNLISTED MICROBIOLOGY PX: CPT

## 2020-03-04 PROCEDURE — 96374 THER/PROPH/DIAG INJ IV PUSH: CPT

## 2020-03-04 PROCEDURE — 80053 COMPREHEN METABOLIC PANEL: CPT | Performed by: EMERGENCY MEDICINE

## 2020-03-04 PROCEDURE — 99285 EMERGENCY DEPT VISIT HI MDM: CPT

## 2020-03-04 RX ORDER — CYCLOBENZAPRINE HCL 10 MG
10 TABLET ORAL 3 TIMES DAILY PRN
Qty: 15 TABLET | Refills: 0 | Status: SHIPPED | OUTPATIENT
Start: 2020-03-04 | End: 2020-03-11

## 2020-03-04 RX ORDER — HYDROMORPHONE HYDROCHLORIDE 1 MG/ML
0.5 INJECTION, SOLUTION INTRAMUSCULAR; INTRAVENOUS; SUBCUTANEOUS ONCE
Status: COMPLETED | OUTPATIENT
Start: 2020-03-04 | End: 2020-03-04

## 2020-03-04 RX ORDER — HYDROCODONE BITARTRATE AND ACETAMINOPHEN 5; 325 MG/1; MG/1
1 TABLET ORAL EVERY 4 HOURS PRN
Qty: 20 TABLET | Refills: 0 | Status: SHIPPED | OUTPATIENT
Start: 2020-03-04 | End: 2020-03-14

## 2020-03-04 RX ORDER — SODIUM CHLORIDE 9 MG/ML
INJECTION, SOLUTION INTRAVENOUS CONTINUOUS
Status: DISCONTINUED | OUTPATIENT
Start: 2020-03-04 | End: 2020-03-04

## 2020-03-04 RX ORDER — KETOROLAC TROMETHAMINE 30 MG/ML
15 INJECTION, SOLUTION INTRAMUSCULAR; INTRAVENOUS ONCE
Status: COMPLETED | OUTPATIENT
Start: 2020-03-04 | End: 2020-03-04

## 2020-03-04 NOTE — ED INITIAL ASSESSMENT (HPI)
Patient reports intermittent cough/cold symptoms for past 2 weeks. Was seen at PMD office yesterday and given zpack and cough syrup.  States around 12 pm today had coughing episode and felt something \"pop\" in right rib cage area with severe pain ever sinc

## 2020-03-04 NOTE — ED PROVIDER NOTES
Patient Seen in: [facility] Emergency Department      History   Patient presents with:  Pain    Stated Complaint: cough, rib pain    HPI    Patient is a pleasant 28-year-old male, presenting for evaluation of right lateral rib pain.     Patient state Pulse 86   Resp 20   Temp 97.8 °F (36.6 °C)   Temp src Temporal   SpO2 96 %   O2 Device None (Room air)       Current:/88   Pulse 66   Temp 97.8 °F (36.6 °C) (Temporal)   Resp 20   Ht 193 cm (6' 4\")   Wt 136.1 kg   SpO2 95%   BMI 36.52 kg/m² Neutrophil Absolute Prelim 11.40 (*)     Neutrophil Absolute 11.40 (*)     Monocyte Absolute 1.03 (*)     All other components within normal limits   RESPIRATORY PANEL FLU EXPANDED - Normal    Narrative:     SARS, MERS and 2019 nCoV coronaviruses are not Patient was placed on a cart, an IV was established, and blood was drawn and sent to the laboratory for further evaluation. An infusion of normal saline was initiated. Patient was observed while the laboratory and radiology studies were obtained.   Nasal sw

## 2020-03-04 NOTE — ED NOTES
Flu swab has been done. Wife at bedside. Patient remains updated on plan of care. Provided water to drink. Alert and appropriate.

## 2020-03-04 NOTE — ED NOTES
Patient taken to xray department at this time by cart. O2 96%. Patient appears slightly tachypnic, not able to take deep breath d/t pain. 3L nc O2 placed on patient for comfort.

## 2020-05-06 PROBLEM — G31.84 MCI (MILD COGNITIVE IMPAIRMENT): Status: ACTIVE | Noted: 2020-05-06

## 2020-09-28 PROBLEM — M25.511 CHRONIC RIGHT SHOULDER PAIN: Status: ACTIVE | Noted: 2020-09-28

## 2020-09-28 PROBLEM — G89.29 CHRONIC RIGHT SHOULDER PAIN: Status: ACTIVE | Noted: 2020-09-28

## 2020-10-26 ENCOUNTER — HOSPITAL ENCOUNTER (OUTPATIENT)
Dept: CT IMAGING | Facility: HOSPITAL | Age: 60
Discharge: HOME | End: 2020-10-26
Attending: INTERNAL MEDICINE
Payer: COMMERCIAL

## 2020-10-26 VITALS — HEART RATE: 63 BPM | DIASTOLIC BLOOD PRESSURE: 76 MMHG | SYSTOLIC BLOOD PRESSURE: 108 MMHG

## 2020-10-26 DIAGNOSIS — R07.9 CHEST PAIN, UNSPECIFIED TYPE: ICD-10-CM

## 2020-10-26 DIAGNOSIS — R42 DIZZINESS: ICD-10-CM

## 2020-10-26 DIAGNOSIS — R06.02 SHORTNESS OF BREATH: ICD-10-CM

## 2020-10-26 PROCEDURE — 75574 CT ANGIO HRT W/3D IMAGE: CPT | Performed by: INTERNAL MEDICINE

## 2020-10-26 PROCEDURE — 82565 ASSAY OF CREATININE: CPT

## 2020-10-26 RX ORDER — METOPROLOL TARTRATE 5 MG/5ML
INJECTION INTRAVENOUS
Status: DISCONTINUED
Start: 2020-10-26 | End: 2020-10-26 | Stop reason: WASHOUT

## 2020-10-26 RX ORDER — NITROGLYCERIN 0.4 MG/1
TABLET SUBLINGUAL
Status: COMPLETED
Start: 2020-10-26 | End: 2020-10-26

## 2020-10-26 RX ADMIN — NITROGLYCERIN 0.4 MG: 0.4 TABLET SUBLINGUAL at 10:17:00

## 2020-10-26 NOTE — IMAGING NOTE
1008 Patient in CT,IV access and GFR in progress. Test explained, patient verbalized understanding and has no questions at this time. 1018 Calcium scoring done. 1023 IV injection and scan by [employer] CT tech. Average HR: 58. Contast total: 90 ml.  Saline to

## 2020-10-27 NOTE — PROGRESS NOTES
Results released to My Chart with provider response.   Future Appointments  11/30/2020 9:00 AM    Mp Cardoza MD         DG CARD        DG

## 2020-11-19 PROBLEM — M25.511 CHRONIC RIGHT SHOULDER PAIN: Status: RESOLVED | Noted: 2020-09-28 | Resolved: 2020-11-19

## 2020-11-19 PROBLEM — G89.29 CHRONIC RIGHT SHOULDER PAIN: Status: RESOLVED | Noted: 2020-09-28 | Resolved: 2020-11-19

## 2021-04-11 DIAGNOSIS — Z23 NEED FOR VACCINATION: ICD-10-CM

## (undated) NOTE — LETTER
Date & Time: 5/7/2018, 9:35 AM  Patient: Demarco Dials  Encounter Provider(s):    Evan Carroll MD       To Whom It May Concern:    Prabhakar Martin was seen and treated in our department on 5/7/2018.  He should not return to work until Thursday, May 10,

## (undated) NOTE — ED AVS SNAPSHOT
Alex Berman   MRN: [id number]    Department:  [facility] Emergency Department   Date of Visit:  3/4/2020           Disclosure     Insurance plans vary and the physician(s) referred by the ER may not be covered by your plan.  Please contact your in tell this physician (or your personal doctor if your instructions are to return to your personal doctor) about any new or lasting problems. The primary care or specialist physician will see patients referred from the [facility] Emergency Department.  Florin Mcclure

## (undated) NOTE — ED AVS SNAPSHOT
Jenni East   MRN: [id number]    Department:  [facility] Emergency Department   Date of Visit:  5/7/2018           Disclosure     Insurance plans vary and the physician(s) referred by the ER may not be covered by your plan.  Please contact your in tell this physician (or your personal doctor if your instructions are to return to your personal doctor) about any new or lasting problems. The primary care or specialist physician will see patients referred from the [facility] Emergency Department.  Steve Segura

## (undated) NOTE — ED AVS SNAPSHOT
[facility] Emergency Department    [facility]  [address] [address]    Phone:  [phone]    Fax:  Valerie Banegas   MRN: [id number]    Department:  [facility] Emergency Department   Date of Visit:  1/22/20 Discharge References/Attachments     SEIZURE, RECURRENT (ADULT) (ENGLISH)      Disclosure     Insurance plans vary and the physician(s) referred by the ER may not be covered by your plan.  Please contact your insurance company to determine coverage for foll If you have been prescribed any medication(s), please fill your prescription right away and begin taking the medication(s) as directed    If the emergency physician has read X-rays, these will be re-interpreted by a radiologist.  If there is a significant can help with your Affordable Care Act coverage, as well as all types of Medicaid plans. To get signed up and covered, please call [phone] and ask to get set up for an insurance coverage that is in-network with [employer].         [place] office, you can view your past visit information in [employer] by going to Visits < Visit Summaries. [employer] questions? Call [phone] for help. [employer] is NOT to be used for urgent needs. For medical emergencies, dial 911.

## (undated) NOTE — ED AVS SNAPSHOT
[facility] Emergency Department    [place]  [address] [address]    Phone:  [phone]    Fax:  Valerie Banegas   MRN: [id number]    Department:  [facility] Emergency Department   Date of Visit:  1/22/20 IF THERE IS ANY CHANGE OR WORSENING OF YOUR CONDITION, CALL YOUR PRIMARY CARE PHYSICIAN AT ONCE OR RETURN IMMEDIATELY TO THE EMERGENCY DEPARTMENT.     If you have been prescribed any medication(s), please fill your prescription right away and begin taking t